# Patient Record
Sex: MALE | Race: OTHER | NOT HISPANIC OR LATINO | URBAN - METROPOLITAN AREA
[De-identification: names, ages, dates, MRNs, and addresses within clinical notes are randomized per-mention and may not be internally consistent; named-entity substitution may affect disease eponyms.]

---

## 2020-02-11 ENCOUNTER — EMERGENCY (EMERGENCY)
Facility: HOSPITAL | Age: 60
LOS: 1 days | Discharge: HOME | End: 2020-02-11
Attending: EMERGENCY MEDICINE
Payer: COMMERCIAL

## 2020-02-11 VITALS
OXYGEN SATURATION: 97 % | WEIGHT: 175.05 LBS | HEART RATE: 84 BPM | TEMPERATURE: 98 F | SYSTOLIC BLOOD PRESSURE: 121 MMHG | DIASTOLIC BLOOD PRESSURE: 73 MMHG | HEIGHT: 70 IN | RESPIRATION RATE: 18 BRPM

## 2020-02-11 VITALS
DIASTOLIC BLOOD PRESSURE: 97 MMHG | RESPIRATION RATE: 19 BRPM | OXYGEN SATURATION: 95 % | SYSTOLIC BLOOD PRESSURE: 158 MMHG | TEMPERATURE: 98 F | HEART RATE: 84 BPM

## 2020-02-11 PROCEDURE — 99284 EMERGENCY DEPT VISIT MOD MDM: CPT

## 2020-02-11 PROCEDURE — 93971 EXTREMITY STUDY: CPT | Mod: 26

## 2020-02-11 PROCEDURE — 93971 EXTREMITY STUDY: CPT

## 2020-02-11 PROCEDURE — 99284 EMERGENCY DEPT VISIT MOD MDM: CPT | Mod: 25

## 2020-02-11 RX ORDER — ACETAMINOPHEN 500 MG
975 TABLET ORAL ONCE
Refills: 0 | Status: COMPLETED | OUTPATIENT
Start: 2020-02-11 | End: 2020-02-11

## 2020-02-11 RX ADMIN — Medication 975 MILLIGRAM(S): at 16:04

## 2020-02-11 NOTE — ED PROVIDER NOTE - NSFOLLOWUPCLINICS_GEN_ALL_ED_FT
[facility] General Internal Medicine  General Internal Medicine  [address] [place] [address]  Phone: [phone]  Fax:   Follow Up Time:     [facility]  General Surgery  [address] [place] [address]  Phone: [phone]  Fax:   Follow Up Time:

## 2020-02-11 NOTE — ED ADULT NURSE REASSESSMENT NOTE - NS ED NURSE REASSESS COMMENT FT1
Pt was wanted to wait to take the tylenol til after US. Pt now given tylenol as per MD order for the R calf pain.

## 2020-02-11 NOTE — ED PROVIDER NOTE - PHYSICAL EXAMINATION
GENERAL: no acute distress, non-toxic appearing  HEAD: normocephalic, atraumatic  HEENT: normal conjunctiva, oral mucosa moist, neck supple  CARDIAC: regular rate and rhythm, normal S1 and S2,  no appreciable murmurs  PULM: clear to ascultation bilaterally, no crackles, rales, rhonchi, or wheezing  GI: abdomen nondistended, soft, nontender, no guarding or rebound tenderness  NEURO: alert and oriented x 3, normal speech, PERRLA, EOMI, no focal motor or sensory deficits, nonantalgic gait  MSK: area of induration, dark discoloration over posterior aspect of superior right calf extending into popliteal area; mildly ttp, no fluctuance  SKIN: no visible rashes, dry, well-perfused  PSYCH: appropriate mood and affect

## 2020-02-11 NOTE — ED PROVIDER NOTE - PATIENT PORTAL LINK FT
You can access the FollowMyHealth Patient Portal offered by [facility] by registering at the following website: [email or web address] By joining [employer]’s FollowMyHealth portal, you will also be able to view your health information using other applications (apps) compatible with our system.

## 2020-02-11 NOTE — ED PROVIDER NOTE - NSFOLLOWUPINSTRUCTIONS_ED_ALL_ED_FT
Your diagnosis: leg pain/swelling    Discharge instructions:    - Please follow up with your Primary Care Doctor for a repeat right lower extremity ultrasound in 5-7 days.    - Tylenol up to 650 mg every 8 hours as needed for pain and/or Motrin up to 600 mg every 6 hours as needed for pain.     - Be sure to return to the ED if you develop new or worsening symptoms. Specific signs and symptoms to be vigilant of: worsening leg pain, swelling, redness, fever, chills, chest pain, shortness of breath.

## 2020-02-11 NOTE — ED ADULT NURSE NOTE - OBJECTIVE STATEMENT
58y/o M coming to the ED c/o of R calf pain. Pt states that a week ago he began to have R lower calf pain that radiated down to his foot. Redness, warmth and discoloration noted on the R upper calf which pt states that has worsened over the past week. Pt states that the 10/10 sharp pain occurs when he puts any weight on that leg and has been limping for the past week. Pt denies any CP/SOB/fever/chills/N/V/D. Pt denies any recent travel, long plane rides or car rides. Pt able to move bilateral legs. Positive peripheral pulses. Pt states that he currently taking ASA daily. Pt denies any current pain while lying in the bed.

## 2020-02-11 NOTE — ED PROVIDER NOTE - NS ED ROS FT
GENERAL: no fever, chills  HEENT: no cough, congestion, odynophagia, dysphagia  CARDIAC: no chest pain, palpitations, lightheadedness  PULM: no dyspnea, wheezing   GI: no abdominal pain, nausea, vomiting, diarrhea, constipation, melena, hematochezia  : no urinary dysuria, frequency, incontinence, hematuria  NEURO: no headache, motor weakness, sensory changes  MSK: + right leg pain  SKIN: no rashes, hives  HEME: no active bleeding, bruising

## 2020-02-11 NOTE — ED PROVIDER NOTE - ATTENDING CONTRIBUTION TO CARE
59M otherwise healthy presenting with 1 week of popliteal, upper posterior calf discoloration likely partial hamstring injury, from, no defect, le us neg for dvt with no risk factors.

## 2020-02-11 NOTE — ED ADULT NURSE NOTE - NSIMPLEMENTINTERV_GEN_ALL_ED
Implemented All Fall with Harm Risk Interventions:  [place] to call system. Call bell, personal items and telephone within reach. Instruct patient to call for assistance. Room bathroom lighting operational. Non-slip footwear when patient is off stretcher. Physically safe environment: no spills, clutter or unnecessary equipment. Stretcher in lowest position, wheels locked, appropriate side rails in place. Provide visual cue, wrist band, yellow gown, etc. Monitor gait and stability. Monitor for mental status changes and reorient to person, place, and time. Review medications for side effects contributing to fall risk. Reinforce activity limits and safety measures with patient and family. Provide visual clues: red socks.

## 2020-02-11 NOTE — ED PROVIDER NOTE - CLINICAL SUMMARY MEDICAL DECISION MAKING FREE TEXT BOX
59M otherwise healthy presenting with 1 week of popliteal, upper posterior calf discoloration, pain, swelling. Likely cellulitis, lower concern for DVT, abscess. However, will obtain DVT study. Likely tbdc.

## 2020-02-11 NOTE — ED PROVIDER NOTE - OBJECTIVE STATEMENT
59M otherwise healthy presenting with 1 week of popliteal, upper posterior calf discoloration, pain, swelling. Patient denies recent travel, trauma to area, bug bites, recent hiking. No chest pain, dyspnea, hx of clots. Patient has been able to walk but it hurts to do so. No fever, chills.

## 2022-10-06 NOTE — ED ADULT NURSE NOTE - CAS DISCH TRANSFER METHOD
Call from Eder,  He noticed a smll lumb about the size of a pea at the puncture site following his ablation. Was concerned.  Explained that was expected, no reason for concern,  It would go away over time.  Report bruising resolving  No additional concerns.   
Private car